# Patient Record
Sex: FEMALE | Race: WHITE | NOT HISPANIC OR LATINO | Employment: UNEMPLOYED | URBAN - METROPOLITAN AREA
[De-identification: names, ages, dates, MRNs, and addresses within clinical notes are randomized per-mention and may not be internally consistent; named-entity substitution may affect disease eponyms.]

---

## 2023-01-01 ENCOUNTER — HOSPITAL ENCOUNTER (EMERGENCY)
Facility: OTHER | Age: 23
Discharge: HOME | End: 2023-01-01
Attending: EMERGENCY MEDICINE
Payer: COMMERCIAL

## 2023-01-01 VITALS
SYSTOLIC BLOOD PRESSURE: 110 MMHG | HEART RATE: 96 BPM | OXYGEN SATURATION: 100 % | RESPIRATION RATE: 18 BRPM | DIASTOLIC BLOOD PRESSURE: 66 MMHG | BODY MASS INDEX: 18.19 KG/M2 | HEIGHT: 68 IN | TEMPERATURE: 98 F | WEIGHT: 120 LBS

## 2023-01-01 DIAGNOSIS — S61.419A HAND LACERATION: ICD-10-CM

## 2023-01-01 PROCEDURE — 99283 EMERGENCY DEPT VISIT LOW MDM: CPT | Mod: 25

## 2023-01-01 PROCEDURE — 12001 RPR S/N/AX/GEN/TRNK 2.5CM/<: CPT

## 2023-01-01 NOTE — ED PROVIDER NOTES
Encounter Date: 1/1/2023    SCRIBE #1 NOTE: I, Anu Reed, am scribing for, and in the presence of,  Ronnell Cesar MD.     History     Chief Complaint   Patient presents with    Laceration     Reports laceration to right hand, unknown cause, patient looked down and notice bleeding from hand. 2 cm laceration noted to right inner palm, bleeding controlled. Tetanus shot 2 yrs ago     This is a 22 y.o. female who presents with complaint of laceration to right hand s/p fall tonight. Patient reports that she fell and landed in gravel, cutting the palm of her right hand. She denies any other injuries. She reports alcohol use tonight. Her last Tetanus shot was two years ago.     The history is provided by the patient.   Review of patient's allergies indicates:  No Known Allergies  No past medical history on file.  No past surgical history on file.  No family history on file.     Review of Systems   Constitutional:  Negative for fever.   HENT:  Negative for congestion.    Eyes:  Negative for redness.   Respiratory:  Negative for shortness of breath.    Cardiovascular:  Negative for chest pain.   Gastrointestinal:  Negative for abdominal pain.   Genitourinary:  Negative for dysuria.   Skin:  Positive for wound. Negative for rash.   Neurological:  Negative for headaches.   Psychiatric/Behavioral:  Negative for confusion.      Physical Exam     Initial Vitals [01/01/23 0247]   BP Pulse Resp Temp SpO2   125/81 81 17 98.4 °F (36.9 °C) 100 %      MAP       --         Physical Exam    Nursing note and vitals reviewed.  Constitutional: She appears well-developed and well-nourished. She is not diaphoretic. No distress.   HENT:   Head: Normocephalic and atraumatic.   Cardiovascular:  S1 normal and S2 normal.           Pulmonary/Chest: No respiratory distress.   Musculoskeletal:      Comments: Right thumb: Full ROM.     Neurological: She is alert and oriented to person, place, and time.   Distally neurovascularly intact.     Skin:   2.5 cm linear laceration over right palm thenar eminence with oozing blood.       ED Course   Lac Repair    Date/Time: 1/1/2023 4:00 AM  Performed by: Ronnell Cesar MD  Authorized by: Ronnell Cesar MD     Consent:     Consent obtained:  Verbal    Consent given by:  Patient    Risks, benefits, and alternatives were discussed: yes      Risks discussed:  Infection, retained foreign body, pain, poor cosmetic result, need for additional repair and poor wound healing  Anesthesia:     Anesthesia method:  Local infiltration    Local anesthetic:  Lidocaine 1% WITH epi  Laceration details:     Location:  Hand    Hand location:  R palm    Length (cm):  2.5  Pre-procedure details:     Preparation:  Patient was prepped and draped in usual sterile fashion and imaging obtained to evaluate for foreign bodies  Exploration:     Limited defect created (wound extended): no      Imaging obtained: x-ray      Imaging outcome: foreign body not noted      Wound exploration: wound explored through full range of motion and entire depth of wound visualized      Wound extent: no fascia violation noted, no foreign bodies/material noted, no muscle damage noted, no nerve damage noted, no tendon damage noted, no underlying fracture noted and no vascular damage noted    Treatment:     Area cleansed with:  Saline    Amount of cleaning:  Standard    Irrigation solution:  Sterile saline    Irrigation method:  Pressure wash  Skin repair:     Repair method:  Sutures    Suture size:  4-0    Suture material:  Nylon    Suture technique:  Simple interrupted    Number of sutures:  5  Approximation:     Approximation:  Close  Repair type:     Repair type:  Simple  Post-procedure details:     Dressing:  Antibiotic ointment and sterile dressing    Procedure completion:  Tolerated well, no immediate complications  Labs Reviewed - No data to display       Imaging Results              X-Ray Hand 2 View Right (Final result)  Result time  01/01/23 04:36:56      Final result by Tianna Mendez MD (01/01/23 04:36:56)                   Impression:      No definite radiographic evidence of acute osseous injury allowing for patient positioning.      Electronically signed by: Tianna Mendez MD  Date:    01/01/2023  Time:    04:36               Narrative:    EXAMINATION:  XR HAND 2 VIEW RIGHT    CLINICAL HISTORY:  Laceration without foreign body of unspecified hand, initial encounter    TECHNIQUE:  Two views of the right hand were performed.    COMPARISON:  None    FINDINGS:  Allowing for patient positioning, the visualized osseous and articular structures appear intact and alignment appears to be within normal limits.  Soft tissue swelling and bandage material involving overlying the radial soft tissues of the wrist.  No large retained radiopaque foreign body identified.                                       Medications - No data to display  Medical Decision Making:   Initial Assessment:       Healthy 22-year-old female presents for evaluation of right hand laceration that she sustained PTA.  Patient is visiting from [place] for new year's, admits to drinking alcohol at a party tonight, and tripped and fell onto gravel which she thinks cut her hand.  She reports immediate bleeding, denies concern for foreign body.  Her tetanus is up-to-date.  On exam patient with 2.5 cm laceration to proximal right thenar eminence, with oozing of blood.  Laceration is somewhat gaping, not amenable to skin glue.  Will check x-ray to rule out any foreign body or less likely bone involvement, extensively irrigate wound and plan on suture closure.    X-ray with no bony abnormalities or sign of foreign body, after extensive irrigation, laceration closed after local anesthesia with 5 superficial sutures and good approximation, patient tolerated procedure well.  Patient advised on suture removal within 7-10 days, and wound care including antibiotic ointment, return  precautions for any signs of infection.  She is comfortable with discharge plan and wound care.      Clinical Tests:   Radiological Study: Ordered and Reviewed        Scribe Attestation:   Scribe #1: I performed the above scribed service and the documentation accurately describes the services I performed. I attest to the accuracy of the note.            I, Dr. Ronnell Cesar, personally performed the services described in this documentation. All medical record entries made by the scribe were at my direction and in my presence.  I have reviewed the chart and agree that the record reflects my personal performance and is accurate and complete. Ronnell Cesar MD.          Clinical Impression:   Final diagnoses:  [S61.419A] Hand laceration        ED Disposition Condition    Discharge Stable          ED Prescriptions    None       Follow-up Information       Follow up With Specialties Details Why Contact Info    [facility] - Emergency Dept Emergency Medicine Go in 1 week For suture removal in 7-10 days [address] [facility] [id number]  [phone]             Ronnell Cesar MD  01/01/23 1936

## 2023-01-01 NOTE — ED TRIAGE NOTES
Pt to ER with complaint of a laceration. Pt reports that she was at a bar when she looked down at her hand and noticed she was bleeding. Pt reports that her tetanus shot was a year ago. Pt unsure how she cut herself. Pt has a 4 cm laceration to R palm of hand. Wound edges well approximated and no foreign body noted in the wound. Bleeding is controlled with a pressure dressing at this time. Pt in no acute distress with chest noted to equal rise and fall. Pt AAOx4. ETOH intoxication.